# Patient Record
Sex: MALE | Race: WHITE | NOT HISPANIC OR LATINO | Employment: FULL TIME | URBAN - METROPOLITAN AREA
[De-identification: names, ages, dates, MRNs, and addresses within clinical notes are randomized per-mention and may not be internally consistent; named-entity substitution may affect disease eponyms.]

---

## 2019-05-14 ENCOUNTER — OFFICE VISIT (OUTPATIENT)
Dept: URGENT CARE | Facility: CLINIC | Age: 29
End: 2019-05-14

## 2019-05-14 VITALS
BODY MASS INDEX: 31.83 KG/M2 | WEIGHT: 210 LBS | HEIGHT: 68 IN | SYSTOLIC BLOOD PRESSURE: 135 MMHG | RESPIRATION RATE: 19 BRPM | HEART RATE: 88 BPM | OXYGEN SATURATION: 99 % | DIASTOLIC BLOOD PRESSURE: 80 MMHG | TEMPERATURE: 98 F

## 2019-05-14 DIAGNOSIS — R07.89 CHEST WALL TENDERNESS: Primary | ICD-10-CM

## 2019-05-14 PROCEDURE — 93010 EKG 12-LEAD: ICD-10-PCS | Mod: S$GLB,,, | Performed by: INTERNAL MEDICINE

## 2019-05-14 PROCEDURE — 93010 ELECTROCARDIOGRAM REPORT: CPT | Mod: S$GLB,,, | Performed by: INTERNAL MEDICINE

## 2019-05-14 PROCEDURE — 93005 ELECTROCARDIOGRAM TRACING: CPT | Mod: S$GLB,,, | Performed by: NURSE PRACTITIONER

## 2019-05-14 PROCEDURE — 99214 PR OFFICE/OUTPT VISIT, EST, LEVL IV, 30-39 MIN: ICD-10-PCS | Mod: S$GLB,,, | Performed by: NURSE PRACTITIONER

## 2019-05-14 PROCEDURE — 99214 OFFICE O/P EST MOD 30 MIN: CPT | Mod: S$GLB,,, | Performed by: NURSE PRACTITIONER

## 2019-05-14 PROCEDURE — 93005 EKG 12-LEAD: ICD-10-PCS | Mod: S$GLB,,, | Performed by: NURSE PRACTITIONER

## 2019-05-14 RX ORDER — TRAZODONE HYDROCHLORIDE 50 MG/1
50 TABLET ORAL NIGHTLY
COMMUNITY

## 2019-05-14 RX ORDER — BUPROPION HYDROCHLORIDE 100 MG/1
100 TABLET ORAL 2 TIMES DAILY
COMMUNITY

## 2019-05-14 NOTE — PROGRESS NOTES
"Subjective:       Patient ID: Edward Reed is a 28 y.o. male.    Vitals:  height is 5' 8" (1.727 m) and weight is 95.3 kg (210 lb). His oral temperature is 98 °F (36.7 °C). His blood pressure is 135/80 and his pulse is 88. His respiration is 19 and oxygen saturation is 99%.     Chief Complaint: Chest Pain    Patient feels sharp tightness in chest and weakness in both arms. This happened suddenly earlier today and has remained to this visit.   Patient describes the pain is intermittently sharp and substernal chest radiating to bilateral arms.  Denies any nausea or vomiting.  Denies any shortness of breath.  Patient reports that he does have PTSD, anxiety and depression.  Patient reports he has not been taking his trazodone for the last 2-3 days.  Due to just forgetting.  Patient denies any SI/HI or AH/VH.   Patient denies any recent anxiety or stress.  Patient reports that he does have some mild depression due to "not wanting to live in [place]. "   Patient claims this chest pain is reproducible upper chest with palpitation.  Patient reports that he does work out regularly but denies any recent changes in his routines.    Chest Pain    This is a new problem. The current episode started today. The onset quality is sudden. The problem occurs constantly. The problem has been unchanged. The pain is at a severity of 8/10. The pain is moderate. The quality of the pain is described as tightness and sharp. The pain radiates to the left arm and right arm. Associated symptoms include headaches. Pertinent negatives include no abdominal pain, back pain, claudication, cough, diaphoresis, dizziness, exertional chest pressure, fever, hemoptysis, irregular heartbeat, leg pain, lower extremity edema, malaise/fatigue, nausea, near-syncope, numbness, orthopnea, palpitations, PND, shortness of breath, sputum production, syncope, vomiting or weakness. The pain is aggravated by nothing. He has tried nothing for the symptoms. The " treatment provided no relief.   Pertinent negatives for past medical history include no strokes.   His family medical history is significant for stroke.       Constitution: Negative for chills, sweating, fatigue and fever.   HENT: Negative for trouble swallowing.    Neck: Negative for neck pain.   Cardiovascular: Positive for chest pain. Negative for chest trauma, leg swelling, palpitations and passing out.   Respiratory: Negative for sleep apnea, cough, sputum production, bloody sputum and shortness of breath.    Gastrointestinal: Negative for abdominal pain, nausea, vomiting and heartburn.   Musculoskeletal: Negative for back pain and pain with walking.   Skin: Negative for rash.   Neurological: Positive for headaches. Negative for dizziness, light-headedness, passing out and numbness.   Hematologic/Lymphatic: Negative for history of blood clots.   Psychiatric/Behavioral: Negative for nervous/anxious. The patient is not nervous/anxious.        Objective:      Physical Exam   Constitutional: He is oriented to person, place, and time. He appears well-developed and well-nourished. He is cooperative.  Non-toxic appearance. He does not appear ill. No distress.   HENT:   Head: Normocephalic and atraumatic.   Right Ear: Hearing, tympanic membrane, external ear and ear canal normal.   Left Ear: Hearing, tympanic membrane, external ear and ear canal normal.   Nose: Nose normal. No mucosal edema, rhinorrhea or nasal deformity. No epistaxis. Right sinus exhibits no maxillary sinus tenderness and no frontal sinus tenderness. Left sinus exhibits no maxillary sinus tenderness and no frontal sinus tenderness.   Mouth/Throat: Uvula is midline, oropharynx is clear and moist and mucous membranes are normal. No trismus in the jaw. Normal dentition. No uvula swelling. No posterior oropharyngeal erythema.   Eyes: Conjunctivae and lids are normal. Right eye exhibits no discharge. Left eye exhibits no discharge. No scleral icterus.    Sclera clear bilat   Neck: Trachea normal, normal range of motion, full passive range of motion without pain and phonation normal. Neck supple.   Cardiovascular: Normal rate, regular rhythm, normal heart sounds, intact distal pulses and normal pulses.  No extrasystoles are present. Exam reveals no decreased pulses.   Pulses:       Carotid pulses are 2+ on the right side, and 2+ on the left side.       Radial pulses are 2+ on the right side, and 2+ on the left side.        Femoral pulses are 2+ on the right side, and 2+ on the left side.       Popliteal pulses are 2+ on the right side, and 2+ on the left side.        Dorsalis pedis pulses are 2+ on the right side, and 2+ on the left side.        Posterior tibial pulses are 2+ on the right side, and 2+ on the left side.       Pulmonary/Chest: Effort normal and breath sounds normal. No respiratory distress.           Abdominal: Soft. Normal appearance and bowel sounds are normal. He exhibits no distension, no pulsatile midline mass and no mass. There is no tenderness.   Musculoskeletal: Normal range of motion. He exhibits no edema or deformity.   Neurological: He is alert and oriented to person, place, and time. He exhibits normal muscle tone. Coordination normal.   Skin: Skin is warm, dry and intact. He is not diaphoretic. No pallor.   Psychiatric: He has a normal mood and affect. His speech is normal and behavior is normal. Judgment and thought content normal. His affect is not blunt. Cognition and memory are normal.     Flat affect noted throughout exam.   Nursing note and vitals reviewed.        EKG: normal EKG, normal sinus rhythm, there are no previous tracings available for comparison, 75 BPM.    Assessment:       1. Chest wall tenderness        Plan:         Concerns of costochondritis based off the patient's clinical presentation.  Is also concerned about a possible anxiety and depression.  The patient is in no acute distress in the clinic today.  EKG is  normal.  Patient is given strict ER precautions and red flag warnings. The EKG is reviewed by Dr. Love.   He advised that the patient be given precautions and follow-up Internal Medicine or Cardiology.  Patient is advised to take ibuprofen with release of possible costochondritis.  He is also advised to start taking his trazodone as prescribed.  Patient has no for red flag warnings in the clinic today.  Heart score is complete on the patient.  Patient has a low heart score 0.  He is low risk based off of the clinical presentation as well as the patient's past medical history.  The patient has no family history heart issues.  The patient denies any nausea or vomiting.  Denies any radiation past the chest wall.  Patient does not smoke or drink.  Patient does not have any past medical history.  Based on these clinical presentations as well as heart sore calculator is advised that the patient be given strict ER precautions are red flag warnings of follow-up with Cardiology.  He verbalizes understanding and is in agreement.    Chest wall tenderness  -     EKG 12-lead  -     Ambulatory referral to Internal Medicine  -     Ambulatory referral to Cardiology    -ER precautions.  -Ibuprofen as needed for possible costochondritis.   -Cardiology and internal medicine referrals.    Patient Instructions     Chest Wall Pain: Costochondritis    The chest pain that you have had today is caused by costochondritis. This condition is caused by an inflammation of the cartilage joining your ribs to your breastbone. It is not caused by heart or lung problems. Your healthcare team has made sure that the chest pain you feel is not from a life threatening cause of chest pain such as heart attack, collapsed lung, blood clot in the lung, tear in the aorta, or esophageal rupture. The inflammation may have been brought on by a blow to the chest, lifting heavy objects, intense exercise, or an illness that made you cough and sneeze a lot.  It often occurs during times of emotional stress. It can be painful, but it is not dangerous. It usually goes away in 1 to 2 weeks. But it may happen again. Rarely, a more serious condition may cause symptoms similar to costochondritis. Thats why its important to watch for the warning signs listed below.  Home care  Follow these guidelines when caring for yourself at home:  · If you feel that emotional stress is a cause of your condition, try to figure out the sources of that stress. It may not be obvious. Learn ways to deal with the stress in your life. This can include regular exercise, muscle relaxation, meditation, or simply taking time out for yourself.  · You may use acetaminophen, ibuprofen, or naproxen to control pain, unless another pain medicine was prescribed. If you have liver or kidney disease or ever had a stomach ulcer, talk with your healthcare provider before using these medicines.  · You can also help ease pain by using a hot, wet compress or heating pad. Use this with or without a medicated skin cream that helps relieves pain.  · Do stretching exercise as advised by your provider.  · Take any prescribed medicines as directed.  Follow-up care  Follow up with your healthcare provider, or as advised, if you do not start to get better in the next 2 days.  When to seek medical advice  Call your healthcare provider right away if any of these occur:  · A change in the type of pain. Call if it feels different, becomes more serious, lasts longer, or spreads into your shoulder, arm, neck, jaw, or back.  · Shortness of breath or pain gets worse when you breathe  · Weakness, dizziness, or fainting  · Cough with dark-colored sputum (phlegm) or blood  · Abdominal pain  · Dark red or black stools  · Fever of 100.4ºF (38ºC) or higher, or as directed by your healthcare provider  Date Last Reviewed: 12/1/2016  © 7288-8882 [employer]. [address] [place] [address], [place] [address]. All rights reserved.  This information is not intended as a substitute for professional medical care. Always follow your healthcare professional's instructions.        Uncertain Causes of Chest Pain    Chest pain can happen for a number of reasons. Sometimes the cause can't be determined. If your condition does not seem serious, and your pain does not appear to be coming from your heart, your healthcare provider may recommend watching it closely. Sometimes the signs of a serious problem take more time to appear. Many problems not related to your heart can cause chest pain.These include:  · Musculoskeletal. Costochondritis, an inflammation of the tissues around the ribs that can occur from trauma or overuse injuries  · Respiratory. Pneumonia, pneumothorax, or pneumonitis (inflammation of the lining of the chest and lungs)  · Gastrointestinal. Esophageal reflux, heartburn, or gallbladder disease  · Anxiety and panic disorders  · Nerve compression and neuritis  · Miscellaneous problems such as aortic aneurysm or pulmonary embolism (a blood clot in the lungs)  Home care  After your visit, follow these recommendations:  · Rest today and avoid strenuous activity.  · Take any prescribed medicine as directed.  · Be aware of any recurrent chest pain and notice any changes  Follow-up care  Follow up with your healthcare provider if you do not start to feel better within 24 hours, or as advised.  Call 911  Call 911 if any of these occur:  · A change in the type of pain: if it feels different, becomes more severe, lasts longer, or begins to spread into your shoulder, arm, neck, jaw or back  · Shortness of breath or increased pain with breathing  · Weakness, dizziness, or fainting  · Rapid heart beat  · Crushing sensation in your chest  When to seek medical advice  Call your healthcare provider right away if any of the following occur:  · Cough with dark colored sputum (phlegm) or blood  · Fever of 100.4ºF (38ºC) or higher, or as directed by your  healthcare provider  · Swelling, pain or redness in one leg  · Shortness of breath  Date Last Reviewed: 12/30/2015  © 2149-0824 [employer]. [address] [place] [address], [place] [address]. All rights reserved. This information is not intended as a substitute for professional medical care. Always follow your healthcare professional's instructions.      -EKG today with no acute changes.  -Ibuprofen as needed  -Referral to follow up with Internal Medicine and Cardiology.  -If the symptoms worsen or you start to experience worsening chest pain with shortness of breath go to the ER.  Please follow up with your Primary care provider within 2-5 days if your signs and symptoms have not resolved or worsen.     If your condition worsens or fails to improve we recommend that you receive another evaluation at the emergency room immediately or contact your primary medical clinic to discuss your concerns.   You must understand that you have received an Urgent Care treatment only and that you may be released before all of your medical problems are known or treated. You, the patient, will arrange for follow up care as instructed.

## 2019-05-14 NOTE — PATIENT INSTRUCTIONS
Chest Wall Pain: Costochondritis    The chest pain that you have had today is caused by costochondritis. This condition is caused by an inflammation of the cartilage joining your ribs to your breastbone. It is not caused by heart or lung problems. Your healthcare team has made sure that the chest pain you feel is not from a life threatening cause of chest pain such as heart attack, collapsed lung, blood clot in the lung, tear in the aorta, or esophageal rupture. The inflammation may have been brought on by a blow to the chest, lifting heavy objects, intense exercise, or an illness that made you cough and sneeze a lot. It often occurs during times of emotional stress. It can be painful, but it is not dangerous. It usually goes away in 1 to 2 weeks. But it may happen again. Rarely, a more serious condition may cause symptoms similar to costochondritis. Thats why its important to watch for the warning signs listed below.  Home care  Follow these guidelines when caring for yourself at home:  · If you feel that emotional stress is a cause of your condition, try to figure out the sources of that stress. It may not be obvious. Learn ways to deal with the stress in your life. This can include regular exercise, muscle relaxation, meditation, or simply taking time out for yourself.  · You may use acetaminophen, ibuprofen, or naproxen to control pain, unless another pain medicine was prescribed. If you have liver or kidney disease or ever had a stomach ulcer, talk with your healthcare provider before using these medicines.  · You can also help ease pain by using a hot, wet compress or heating pad. Use this with or without a medicated skin cream that helps relieves pain.  · Do stretching exercise as advised by your provider.  · Take any prescribed medicines as directed.  Follow-up care  Follow up with your healthcare provider, or as advised, if you do not start to get better in the next 2 days.  When to seek medical  advice  Call your healthcare provider right away if any of these occur:  · A change in the type of pain. Call if it feels different, becomes more serious, lasts longer, or spreads into your shoulder, arm, neck, jaw, or back.  · Shortness of breath or pain gets worse when you breathe  · Weakness, dizziness, or fainting  · Cough with dark-colored sputum (phlegm) or blood  · Abdominal pain  · Dark red or black stools  · Fever of 100.4ºF (38ºC) or higher, or as directed by your healthcare provider  Date Last Reviewed: 12/1/2016 © 2000-2017 [employer]. [address] [place] [id number]. All rights reserved. This information is not intended as a substitute for professional medical care. Always follow your healthcare professional's instructions.        Uncertain Causes of Chest Pain    Chest pain can happen for a number of reasons. Sometimes the cause can't be determined. If your condition does not seem serious, and your pain does not appear to be coming from your heart, your healthcare provider may recommend watching it closely. Sometimes the signs of a serious problem take more time to appear. Many problems not related to your heart can cause chest pain.These include:  · Musculoskeletal. Costochondritis, an inflammation of the tissues around the ribs that can occur from trauma or overuse injuries  · Respiratory. Pneumonia, pneumothorax, or pneumonitis (inflammation of the lining of the chest and lungs)  · Gastrointestinal. Esophageal reflux, heartburn, or gallbladder disease  · Anxiety and panic disorders  · Nerve compression and neuritis  · Miscellaneous problems such as aortic aneurysm or pulmonary embolism (a blood clot in the lungs)  Home care  After your visit, follow these recommendations:  · Rest today and avoid strenuous activity.  · Take any prescribed medicine as directed.  · Be aware of any recurrent chest pain and notice any changes  Follow-up care  Follow up with your healthcare  provider if you do not start to feel better within 24 hours, or as advised.  Call 911  Call 911 if any of these occur:  · A change in the type of pain: if it feels different, becomes more severe, lasts longer, or begins to spread into your shoulder, arm, neck, jaw or back  · Shortness of breath or increased pain with breathing  · Weakness, dizziness, or fainting  · Rapid heart beat  · Crushing sensation in your chest  When to seek medical advice  Call your healthcare provider right away if any of the following occur:  · Cough with dark colored sputum (phlegm) or blood  · Fever of 100.4ºF (38ºC) or higher, or as directed by your healthcare provider  · Swelling, pain or redness in one leg  · Shortness of breath  Date Last Reviewed: 12/30/2015  © 6535-2223 [employer]. [address] [place] [address]. All rights reserved. This information is not intended as a substitute for professional medical care. Always follow your healthcare professional's instructions.      -EKG today with no acute changes.  -Ibuprofen as needed  -Referral to follow up with Internal Medicine and Cardiology.  -If the symptoms worsen or you start to experience worsening chest pain with shortness of breath go to the ER.  Please follow up with your Primary care provider within 2-5 days if your signs and symptoms have not resolved or worsen.     If your condition worsens or fails to improve we recommend that you receive another evaluation at the emergency room immediately or contact your primary medical clinic to discuss your concerns.   You must understand that you have received an Urgent Care treatment only and that you may be released before all of your medical problems are known or treated. You, the patient, will arrange for follow up care as instructed.